# Patient Record
Sex: MALE | URBAN - METROPOLITAN AREA
[De-identification: names, ages, dates, MRNs, and addresses within clinical notes are randomized per-mention and may not be internally consistent; named-entity substitution may affect disease eponyms.]

---

## 2017-07-07 ENCOUNTER — OFFICE VISIT (OUTPATIENT)
Dept: FAMILY MEDICINE CLINIC | Age: 22
End: 2017-07-07

## 2017-07-07 VITALS
WEIGHT: 163 LBS | SYSTOLIC BLOOD PRESSURE: 123 MMHG | OXYGEN SATURATION: 99 % | HEIGHT: 68 IN | BODY MASS INDEX: 24.71 KG/M2 | DIASTOLIC BLOOD PRESSURE: 71 MMHG | HEART RATE: 72 BPM | TEMPERATURE: 98.1 F | RESPIRATION RATE: 18 BRPM

## 2017-07-07 DIAGNOSIS — R06.02 SHORTNESS OF BREATH: ICD-10-CM

## 2017-07-07 DIAGNOSIS — R00.0 RACING HEART BEAT: ICD-10-CM

## 2017-07-07 DIAGNOSIS — F41.1 GAD (GENERALIZED ANXIETY DISORDER): Primary | ICD-10-CM

## 2017-07-07 DIAGNOSIS — Z13.220 LIPID SCREENING: ICD-10-CM

## 2017-07-07 RX ORDER — ALPRAZOLAM 0.5 MG/1
0.5 TABLET ORAL
Qty: 15 TAB | Refills: 1 | Status: SHIPPED | OUTPATIENT
Start: 2017-07-07

## 2017-07-07 NOTE — PATIENT INSTRUCTIONS
Benzodiazepines: Potential side effects of benzodiazepine medications include, but are not limited to, the possibility of \"paradoxical agitation\" with irritability, aggressiveness or stimulated behavior; clumsiness, slurring of speech, dulled facies, psychomotor impairment, anterograde amnesia, impaired awareness of degree of drug effect, visual and hearing sensitivity impairment, other psychiatric/behavioral disturbances, impacts operating certain machinery or engaging in certain activities or employment, anxiety, insomnia, anorexia, tremor, nausea, vomiting, diarrhea and potential to develop tolerance, dependence, addiction and death from overdose. Use caution while taking benzodiazepines. There is a potential to overdose on this medication when too many pills are taken at one time. Do not mix alcohol with this medication because it can worsen side effects and be very dangerous.

## 2017-07-07 NOTE — PROGRESS NOTES
Chief Complaint   Patient presents with    Headache    Numbness     in leg    Labs     Patient in office today for sx that began 3 weeks ago;pt would like labs checked,pt is not fasting. 1. Have you been to the ER, urgent care clinic since your last visit? Hospitalized since your last visit? No    2. Have you seen or consulted any other health care providers outside of the [address] [place] [address] since your last visit? Include any pap smears or colon screening.  No

## 2017-07-07 NOTE — PROGRESS NOTES
Chief Complaint   Patient presents with    Headache    Numbness     in leg    Labs     Patient in office today for sx that began 3 weeks ago; pt would like labs checked, pt is not fasting. 1. Have you been to the ER, urgent care clinic since your last visit? Hospitalized since your last visit? No    2. Have you seen or consulted any other health care providers outside of the [address] [place] [address] since your last visit? Include any pap smears or colon screening. No    Pt noticed sx start about 3 weeks ago while drinking a pepsi. Had sx of legs feeling numb, dyspnea, and heart beating fast. Lasted for about 2-3 hours. Tried to jog it off but made the dyspnea worse. Denies any trauma or injury to the back. Describes as not being able to feel his legs. R>L. Denies legs giving out on him. Has recurred a few times since then. Last occurred about a week ago. Some associated cp. Denies any palpitations. Denies any OTCs for sx. Associated HA. Denies any dizziness. Family history of diabetes. Denies any n/v/c/d. Denies any urinary sx. Denies any fever. Pt has a history of anxiety in the past. Has previously been prescribed xanax in the past. Had similar sx when he was struggling with anxiety. Denies any increased stress levels. Denies any family history of anxiety. Denies any other concerns at this time. Chief Complaint   Patient presents with    Headache    Numbness     in leg    Labs     he is a 25y.o. year old male who presents for evalution. Reviewed PmHx, RxHx, FmHx, SocHx, AllgHx and updated and dated in the chart.     Review of Systems - negative except as listed above in the HPI    Objective:     Vitals:    07/07/17 1117   BP: 123/71   Pulse: 72   Resp: 18   Temp: 98.1 °F (36.7 °C)   TempSrc: Oral   SpO2: 99%   Weight: 163 lb (73.9 kg)   Height: 5' 8\" (1.727 m)     Physical Examination: General appearance - alert, well appearing, and in no distress  Mental status - anxious  Eyes - pupils equal and reactive, extraocular eye movements intact  Ears - bilateral TM's and external ear canals normal  Nose - normal and patent, no erythema, discharge or polyps and normal nontender sinuses  Mouth - mucous membranes moist, pharynx normal without lesions  Neck - supple, no significant adenopathy, carotids upstroke normal bilaterally, no bruits, thyroid exam: thyroid is normal in size without nodules or tenderness  Chest - clear to auscultation, no wheezes, rales or rhonchi, symmetric air entry  Heart - normal rate, regular rhythm, normal S1, S2, no murmurs    Assessment/ Plan:   Christie Gifford was seen today for headache, numbness and labs. Diagnoses and all orders for this visit:    KATARINA (generalized anxiety disorder)  -     METABOLIC PANEL, COMPREHENSIVE  -     CBC WITH AUTOMATED DIFF  -     TSH 3RD GENERATION  -     ALPRAZolam (XANAX) 0.5 mg tablet; Take 1 Tab by mouth daily as needed for Anxiety. Will notify results and deviate plan based on findings. Resume xanax daily as needed for acute anxiety sx. Reinforced SEs/ADRs of medication and how to take responsibly. Racing heart beat/ Shortness of breath  Sx seem to be consistent with poorly controlled anxiety. Enc pt to follow up if sx unresponsive to PRN xanax or if they persist /worsen. Lipid screening  -     LIPID PANEL  Will notify results and deviate plan based on findings. Enc heart healthy diet and exercise as tolerated. Follow-up Disposition:  Return if symptoms worsen or fail to improve. I have discussed the diagnosis with the patient and the intended plan as seen in the above orders. The patient has received an after-visit summary and questions were answered concerning future plans.      Medication Side Effects and Warnings were discussed with patient: yes  Patient Labs were reviewed and or requested: yes  Patient Past Records were reviewed and or requested  yes  Patient / Caregiver Understanding of treatment plan was verbalized during office visit YES    CHAPIN rPeston-C    Patient Instructions   Benzodiazepines: Potential side effects of benzodiazepine medications include, but are not limited to, the possibility of \"paradoxical agitation\" with irritability, aggressiveness or stimulated behavior; clumsiness, slurring of speech, dulled facies, psychomotor impairment, anterograde amnesia, impaired awareness of degree of drug effect, visual and hearing sensitivity impairment, other psychiatric/behavioral disturbances, impacts operating certain machinery or engaging in certain activities or employment, anxiety, insomnia, anorexia, tremor, nausea, vomiting, diarrhea and potential to develop tolerance, dependence, addiction and death from overdose. Use caution while taking benzodiazepines. There is a potential to overdose on this medication when too many pills are taken at one time. Do not mix alcohol with this medication because it can worsen side effects and be very dangerous.

## 2017-07-07 NOTE — MR AVS SNAPSHOT
Visit Information Date & Time Provider Department Dept. Phone Encounter #  
 7/7/2017 11:15 AM Tabitha Canales NP [address] [phone] [id number] Follow-up Instructions Return if symptoms worsen or fail to improve. Upcoming Health Maintenance Date Due  
 HPV AGE 9Y-34Y (1 of 3 - Male 3 Dose Series) 3/10/2007 DTaP/Tdap/Td series (1 - Tdap) 3/10/2017 INFLUENZA AGE 9 TO ADULT 8/1/2017 Allergies as of 7/7/2017  Review Complete On: 7/7/2017 By: Tabitha Canales NP Severity Noted Reaction Type Reactions Pcn [Penicillins] High 09/14/2015   Intolerance Rash Pollen Extracts Medium 09/14/2015    Sneezing Current Immunizations  Never Reviewed No immunizations on file. Not reviewed this visit You Were Diagnosed With   
  
 Codes Comments KATARINA (generalized anxiety disorder)    -  Primary ICD-10-CM: F41.1 ICD-9-CM: 300.02 Racing heart beat     ICD-10-CM: R00.0 ICD-9-CM: 682. 0 Shortness of breath     ICD-10-CM: R06.02 
ICD-9-CM: 786.05 Lipid screening     ICD-10-CM: M74.764 ICD-9-CM: V77.91 Vitals BP Pulse Temp Resp Height(growth percentile) Weight(growth percentile) 123/71 (BP 1 Location: Right arm, BP Patient Position: Sitting) 72 98.1 °F (36.7 °C) (Oral) 18 5' 8\" (1.727 m) 163 lb (73.9 kg) SpO2 BMI Smoking Status 99% 24.78 kg/m2 Never Smoker Vitals History BMI and BSA Data Body Mass Index Body Surface Area 24.78 kg/m 2 1.88 m 2 Preferred Pharmacy Pharmacy Name Phone [facility] [address] [place] [address] [address] [phone] Your Updated Medication List  
  
   
This list is accurate as of: 7/7/17 11:39 AM.  Always use your most recent med list.  
  
  
  
  
 ALPRAZolam 0.5 mg tablet Commonly known as:  Will Castro Take 1 Tab by mouth daily as needed for Anxiety. Iron 325 mg (65 mg iron) tablet Generic drug:  ferrous sulfate Take  by mouth Daily (before breakfast). Prescriptions Printed Refills ALPRAZolam (XANAX) 0.5 mg tablet 1 Sig: Take 1 Tab by mouth daily as needed for Anxiety. Class: Print Route: Oral  
  
We Performed the Following CBC WITH AUTOMATED DIFF [20343 CPT(R)] LIPID PANEL [67842 CPT(R)] METABOLIC PANEL, COMPREHENSIVE [05534 CPT(R)] TSH 3RD GENERATION [69807 CPT(R)] Follow-up Instructions Return if symptoms worsen or fail to improve. Patient Instructions Benzodiazepines: Potential side effects of benzodiazepine medications include, but are not limited to, the possibility of \"paradoxical agitation\" with irritability, aggressiveness or stimulated behavior; clumsiness, slurring of speech, dulled facies, psychomotor impairment, anterograde amnesia, impaired awareness of degree of drug effect, visual and hearing sensitivity impairment, other psychiatric/behavioral disturbances, impacts operating certain machinery or engaging in certain activities or employment, anxiety, insomnia, anorexia, tremor, nausea, vomiting, diarrhea and potential to develop tolerance, dependence, addiction and death from overdose. Use caution while taking benzodiazepines. There is a potential to overdose on this medication when too many pills are taken at one time. Do not mix alcohol with this medication because it can worsen side effects and be very dangerous. Introducing [facility]! [facility] introduces Efficiency Exchange patient portal. Now you can access parts of your medical record, email your doctor's office, and request medication refills online. 1. In your internet browser, go to [email or web address] 2. Click on the First Time User? Click Here link in the Sign In box. You will see the New Member Sign Up page. 3. Enter your Efficiency Exchange Access Code exactly as it appears below.  You will not need to use this code after youve completed the sign-up process. If you do not sign up before the expiration date, you must request a new code. · AdventureDrop Access Code: [email or web address] Expires: 10/5/2017 11:39 AM 
 
4. Enter the last four digits of your Social Security Number (xxxx) and Date of Birth (mm/dd/yyyy) as indicated and click Submit. You will be taken to the next sign-up page. 5. Create a AdventureDrop ID. This will be your AdventureDrop login ID and cannot be changed, so think of one that is secure and easy to remember. 6. Create a AdventureDrop password. You can change your password at any time. 7. Enter your Password Reset Question and Answer. This can be used at a later time if you forget your password. 8. Enter your e-mail address. You will receive e-mail notification when new information is available in [address] 9. Click Sign Up. You can now view and download portions of your medical record. 10. Click the Download Summary menu link to download a portable copy of your medical information. If you have questions, please visit the Frequently Asked Questions section of the AdventureDrop website. Remember, AdventureDrop is NOT to be used for urgent needs. For medical emergencies, dial 911. Now available from your iPhone and Android! Please provide this summary of care documentation to your next provider. Your primary care clinician is listed as ANNE WALL. If you have any questions after today's visit, please call [phone].

## 2017-07-08 LAB
ALBUMIN SERPL-MCNC: 4.3 G/DL (ref 3.5–5.5)
ALBUMIN/GLOB SERPL: 1.5 {RATIO} (ref 1.2–2.2)
ALP SERPL-CCNC: 51 IU/L (ref 39–117)
ALT SERPL-CCNC: 16 IU/L (ref 0–44)
AST SERPL-CCNC: 17 IU/L (ref 0–40)
BASOPHILS # BLD AUTO: 0.1 X10E3/UL (ref 0–0.2)
BASOPHILS NFR BLD AUTO: 1 %
BILIRUB SERPL-MCNC: 0.4 MG/DL (ref 0–1.2)
BUN SERPL-MCNC: 11 MG/DL (ref 6–20)
BUN/CREAT SERPL: 9 (ref 9–20)
CALCIUM SERPL-MCNC: 9.5 MG/DL (ref 8.7–10.2)
CHLORIDE SERPL-SCNC: 103 MMOL/L (ref 96–106)
CHOLEST SERPL-MCNC: 137 MG/DL (ref 100–199)
CO2 SERPL-SCNC: 25 MMOL/L (ref 18–29)
CREAT SERPL-MCNC: 1.18 MG/DL (ref 0.76–1.27)
EOSINOPHIL # BLD AUTO: 0.1 X10E3/UL (ref 0–0.4)
EOSINOPHIL NFR BLD AUTO: 3 %
ERYTHROCYTE [DISTWIDTH] IN BLOOD BY AUTOMATED COUNT: 12.9 % (ref 12.3–15.4)
GLOBULIN SER CALC-MCNC: 2.9 G/DL (ref 1.5–4.5)
GLUCOSE SERPL-MCNC: 89 MG/DL (ref 65–99)
HCT VFR BLD AUTO: 43 % (ref 37.5–51)
HDLC SERPL-MCNC: 42 MG/DL
HGB BLD-MCNC: 13.9 G/DL (ref 12.6–17.7)
IMM GRANULOCYTES # BLD: 0 X10E3/UL (ref 0–0.1)
IMM GRANULOCYTES NFR BLD: 0 %
INTERPRETATION, 910389: NORMAL
LDLC SERPL CALC-MCNC: 84 MG/DL (ref 0–99)
LYMPHOCYTES # BLD AUTO: 1.7 X10E3/UL (ref 0.7–3.1)
LYMPHOCYTES NFR BLD AUTO: 47 %
MCH RBC QN AUTO: 26.1 PG (ref 26.6–33)
MCHC RBC AUTO-ENTMCNC: 32.3 G/DL (ref 31.5–35.7)
MCV RBC AUTO: 81 FL (ref 79–97)
MONOCYTES # BLD AUTO: 0.3 X10E3/UL (ref 0.1–0.9)
MONOCYTES NFR BLD AUTO: 8 %
NEUTROPHILS # BLD AUTO: 1.5 X10E3/UL (ref 1.4–7)
NEUTROPHILS NFR BLD AUTO: 41 %
PLATELET # BLD AUTO: 355 X10E3/UL (ref 150–379)
POTASSIUM SERPL-SCNC: 4.4 MMOL/L (ref 3.5–5.2)
PROT SERPL-MCNC: 7.2 G/DL (ref 6–8.5)
RBC # BLD AUTO: 5.32 X10E6/UL (ref 4.14–5.8)
SODIUM SERPL-SCNC: 141 MMOL/L (ref 134–144)
TRIGL SERPL-MCNC: 54 MG/DL (ref 0–149)
TSH SERPL DL<=0.005 MIU/L-ACNC: 1.02 UIU/ML (ref 0.45–4.5)
VLDLC SERPL CALC-MCNC: 11 MG/DL (ref 5–40)
WBC # BLD AUTO: 3.6 X10E3/UL (ref 3.4–10.8)

## 2017-07-10 NOTE — PROGRESS NOTES
The following message was sent to pt via [facility] portal in reference to lab results:    Good morning Mr. Brandon Wagner are the results of your most recent lab work. I have the following recommendations:    1. Your CBC which looks at your white blood cells, red blood cells, and hemoglobin came back looking normal. No sign of infection or anemia. 2. Your metabolic panel which looks at your blood glucose, liver function, and kidney function looks perfect. 3. Your cholesterol came back looking great so keep up the good work with diet and exercise. 4. Your TSH which screens for thyroid disease came back normal. This means you do not have hyper or hypothyroidism. Lets recheck these labs in 1 year.  Please do not hesitate to call me or schedule an appointment to be seen if you need anything else in the meantime :)    MATTHEW Estrada

## 2017-08-03 ENCOUNTER — OFFICE VISIT (OUTPATIENT)
Dept: FAMILY MEDICINE CLINIC | Age: 22
End: 2017-08-03

## 2017-08-03 VITALS
TEMPERATURE: 98.4 F | WEIGHT: 160 LBS | SYSTOLIC BLOOD PRESSURE: 116 MMHG | DIASTOLIC BLOOD PRESSURE: 72 MMHG | HEART RATE: 68 BPM | HEIGHT: 68 IN | RESPIRATION RATE: 20 BRPM | BODY MASS INDEX: 24.25 KG/M2 | OXYGEN SATURATION: 98 %

## 2017-08-03 DIAGNOSIS — F32.A DEPRESSION, UNSPECIFIED DEPRESSION TYPE: Primary | ICD-10-CM

## 2017-08-03 RX ORDER — CITALOPRAM 20 MG/1
20 TABLET, FILM COATED ORAL DAILY
Qty: 30 TAB | Refills: 1 | Status: SHIPPED | OUTPATIENT
Start: 2017-08-03

## 2017-08-03 NOTE — PROGRESS NOTES
Patient here for random sadness x couple of years. It is not getting worse, but it still continues. 1. Have you been to the ER, urgent care clinic since your last visit? Hospitalized since your last visit? No    2. Have you seen or consulted any other health care providers outside of the [address] [place] [address] since your last visit? Include any pap smears or colon screening. No       Has random sadness and anxiety       Chief Complaint   Patient presents with    Behavioral Problem     random sadness x couple years     he is a 25y.o. year old male who presents for evalution. Reviewed PmHx, RxHx, FmHx, SocHx, AllgHx and updated and dated in the chart. Patient Active Problem List    Diagnosis    Episodic circadian rhythm sleep disorder, shift work type       Review of Systems - negative except as listed above in the HPI    Objective:     Vitals:    08/03/17 1505   BP: 116/72   Pulse: 68   Resp: 20   Temp: 98.4 °F (36.9 °C)   SpO2: 98%   Weight: 160 lb (72.6 kg)   Height: 5' 8\" (1.727 m)     Physical Examination: General appearance - alert, well appearing, and in no distress    Assessment/ Plan:   Diagnoses and all orders for this visit:    1. Depression, unspecified depression type  -     citalopram (CELEXA) 20 mg tablet; Take 1 Tab by mouth daily. Indications: DEPRESSION ASSOCIATED WITH BIPOLAR DISORDER       Follow-up Disposition:  Return in about 1 month (around 9/3/2017). I have discussed the diagnosis with the patient and the intended plan as seen in the above orders. The patient understands and agrees with the plan. The patient has received an after-visit summary and questions were answered concerning future plans. Medication Side Effects and Warnings were discussed with patient  Patient Labs were reviewed and or requested:  Patient Past Records were reviewed and or requested    Benny Baez M.D. There are no Patient Instructions on file for this visit.

## 2022-09-01 ENCOUNTER — VIRTUAL VISIT (OUTPATIENT)
Dept: FAMILY MEDICINE CLINIC | Age: 27
End: 2022-09-01

## 2022-09-01 DIAGNOSIS — G47.33 OSA (OBSTRUCTIVE SLEEP APNEA): ICD-10-CM

## 2022-09-01 DIAGNOSIS — F51.01 PRIMARY INSOMNIA: Primary | ICD-10-CM

## 2022-09-01 PROCEDURE — 99213 OFFICE O/P EST LOW 20 MIN: CPT | Performed by: FAMILY MEDICINE

## 2022-09-01 RX ORDER — TRAZODONE HYDROCHLORIDE 50 MG/1
50 TABLET ORAL
Qty: 30 TABLET | Refills: 1 | Status: SHIPPED | OUTPATIENT
Start: 2022-09-01

## 2022-09-01 NOTE — PROGRESS NOTES
Consent: Garrett Hashimoto, who was seen by synchronous (real-time) audio-video technology, and/or his healthcare decision maker, is aware that this patient-initiated, Telehealth encounter on 9/1/2022 is a billable service, with coverage as determined by his insurance carrier. He is aware that he may receive a bill and has provided verbal consent to proceed: YES-Consent obtained within past 12 months  712    Prior to Admission medications    Medication Sig Start Date End Date Taking? Authorizing Provider   traZODone (DESYREL) 50 mg tablet Take 1 Tablet by mouth nightly. 9/1/22  Yes Lady Arias MD   citalopram (CELEXA) 20 mg tablet Take 1 Tab by mouth daily. Indications: DEPRESSION ASSOCIATED WITH BIPOLAR DISORDER 8/3/17   Lady Arias MD   ALPRAZolam Letitia Gather) 0.5 mg tablet Take 1 Tab by mouth daily as needed for Anxiety. 7/7/17   Valerie Garza NP   ferrous sulfate (IRON) 325 mg (65 mg iron) tablet Take  by mouth Daily (before breakfast). Provider, Historical     Allergies   Allergen Reactions    Pcn [Penicillins] Rash    Pollen Extracts Sneezing           Assessment & Plan:   Diagnoses and all orders for this visit:    1. Primary insomnia  -     traZODone (DESYREL) 50 mg tablet; Take 1 Tablet by mouth nightly. Patient having difficulty falling asleep. He is using his CPAP but that has not solved the overall problem. We will add medicine as above and follow-up if not better. 2. ELAINE (obstructive sleep apnea)  Advised patient to continue use of CPAP. Medication Side Effects and Warnings were discussed with patient  Patient Labs were reviewed and or requested:  Patient Past Records were reviewed and or requested              We discussed the expected course, resolution and complications of the diagnosis(es) in detail. Medication risks, benefits, costs, interactions, and alternatives were discussed as indicated.   I advised him to contact the office if his condition worsens, changes or fails to improve as anticipated. He expressed understanding with the diagnosis(es) and plan. Meera Marsh, was evaluated through a synchronous (real-time) audio-video encounter. The patient (or guardian if applicable) is aware that this is a billable service, which includes applicable co-pays. This Virtual Visit was conducted with patient's (and/or legal guardian's) consent. The visit was conducted pursuant to the emergency declaration under the [address] [place] [address] authority and the [employer] and [employer] General Act. Patient identification was verified, and a caregiver was present when appropriate. The patient was located in a state where the provider was licensed to provide care. Services were provided through a video synchronous discussion virtually to substitute for in-person clinic visit. Patient and provider were located at their individual homes. I have discussed the diagnosis with the patient and the intended plan as seen in the above orders. The patient understands and agrees with the plan. The patient has received an after-visit summary and questions were answered concerning future plans. Medication Side Effects and Warnings were discussed with patient  Patient Labs were reviewed and or requested:  Patient Past Records were reviewed and or requested    Jerome Whitley M.D. There are no Patient Instructions on file for this visit.

## 2022-12-23 ENCOUNTER — TELEPHONE (OUTPATIENT)
Dept: SLEEP MEDICINE | Age: 27
End: 2022-12-23

## 2023-05-10 ENCOUNTER — TELEPHONE (OUTPATIENT)
Age: 28
End: 2023-05-10

## 2023-05-15 ENCOUNTER — TELEMEDICINE (OUTPATIENT)
Age: 28
End: 2023-05-15
Payer: MEDICAID

## 2023-05-15 DIAGNOSIS — R05.3 CHRONIC COUGH: Primary | ICD-10-CM

## 2023-05-15 PROCEDURE — 99213 OFFICE O/P EST LOW 20 MIN: CPT | Performed by: NURSE PRACTITIONER

## 2023-05-15 RX ORDER — FEXOFENADINE HCL 180 MG/1
180 TABLET ORAL DAILY
Qty: 90 TABLET | Refills: 1 | Status: SHIPPED | OUTPATIENT
Start: 2023-05-15

## 2023-05-15 SDOH — ECONOMIC STABILITY: INCOME INSECURITY: HOW HARD IS IT FOR YOU TO PAY FOR THE VERY BASICS LIKE FOOD, HOUSING, MEDICAL CARE, AND HEATING?: NOT VERY HARD

## 2023-05-15 SDOH — ECONOMIC STABILITY: FOOD INSECURITY: WITHIN THE PAST 12 MONTHS, YOU WORRIED THAT YOUR FOOD WOULD RUN OUT BEFORE YOU GOT MONEY TO BUY MORE.: NEVER TRUE

## 2023-05-15 SDOH — ECONOMIC STABILITY: FOOD INSECURITY: WITHIN THE PAST 12 MONTHS, THE FOOD YOU BOUGHT JUST DIDN'T LAST AND YOU DIDN'T HAVE MONEY TO GET MORE.: NEVER TRUE

## 2023-05-15 SDOH — ECONOMIC STABILITY: HOUSING INSECURITY
IN THE LAST 12 MONTHS, WAS THERE A TIME WHEN YOU DID NOT HAVE A STEADY PLACE TO SLEEP OR SLEPT IN A SHELTER (INCLUDING NOW)?: NO

## 2023-05-15 ASSESSMENT — ENCOUNTER SYMPTOMS
SINUS PAIN: 0
RHINORRHEA: 0
SHORTNESS OF BREATH: 0
WHEEZING: 0
COUGH: 1
SORE THROAT: 0
GASTROINTESTINAL NEGATIVE: 1
EYES NEGATIVE: 1
ALLERGIC/IMMUNOLOGIC NEGATIVE: 1

## 2023-05-15 ASSESSMENT — PATIENT HEALTH QUESTIONNAIRE - PHQ9
SUM OF ALL RESPONSES TO PHQ QUESTIONS 1-9: 0
SUM OF ALL RESPONSES TO PHQ9 QUESTIONS 1 & 2: 0
2. FEELING DOWN, DEPRESSED OR HOPELESS: 0
SUM OF ALL RESPONSES TO PHQ QUESTIONS 1-9: 0
SUM OF ALL RESPONSES TO PHQ QUESTIONS 1-9: 0
1. LITTLE INTEREST OR PLEASURE IN DOING THINGS: 0
SUM OF ALL RESPONSES TO PHQ QUESTIONS 1-9: 0

## 2023-05-15 ASSESSMENT — ANXIETY QUESTIONNAIRES
IF YOU CHECKED OFF ANY PROBLEMS ON THIS QUESTIONNAIRE, HOW DIFFICULT HAVE THESE PROBLEMS MADE IT FOR YOU TO DO YOUR WORK, TAKE CARE OF THINGS AT HOME, OR GET ALONG WITH OTHER PEOPLE: NOT DIFFICULT AT ALL
7. FEELING AFRAID AS IF SOMETHING AWFUL MIGHT HAPPEN: 0
5. BEING SO RESTLESS THAT IT IS HARD TO SIT STILL: 0
GAD7 TOTAL SCORE: 1
6. BECOMING EASILY ANNOYED OR IRRITABLE: 0
4. TROUBLE RELAXING: 0
2. NOT BEING ABLE TO STOP OR CONTROL WORRYING: 0
3. WORRYING TOO MUCH ABOUT DIFFERENT THINGS: 1
1. FEELING NERVOUS, ANXIOUS, OR ON EDGE: 0

## 2023-05-15 NOTE — PROGRESS NOTES
Tristan Robertson is a 33 y.o. male , id x 2(name and ). Reviewed questionnaires, and  medications. Chief Complaint   Patient presents with    Cough     Constant dry cough x 5 years. Tried albuterol inhaler, vannair inhaler no relief. Developed sleep apnea, thinks it may be related. 1. Have you been to the ER, urgent care clinic since your last visit? Hospitalized since your last visit? No    2. Have you seen or consulted any other health care providers outside of the [address] [place] [address] since your last visit? Include any pap smears or colon screening.  No

## 2023-05-15 NOTE — PROGRESS NOTES
Melida Mccall (:  1995) is a Established patient, presenting virtually for evaluation of the following:    Assessment & Plan :    Below is the assessment and plan developed based on review of pertinent history, physical exam, labs, studies, and medications. 1. Chronic cough  Dwp most common causes of chronic cough for patients in his age group include allergic rhinitis and GERD  Will add antihistamine daily. If no improvement after 4 weeks, favor trial of PPI  Recommended follow up with pulmonary for previous abn PFT, referral entered to Dr. Marielle Phan. Patient will check with his new insurance carrier and let me know if provider needs to be changed  -     XR CHEST (2 VIEWS); Future  -     fexofenadine (ALLEGRA) 180 MG tablet; Take 1 tablet by mouth daily, Disp-90 tablet, R-1Normal  -     AFL - Suri Bailon MD, Pulmonology, [place]    Return in about 4 weeks (around 2023), or if symptoms worsen or fail to improve, for cough. I have discussed the diagnosis with the patient and the intended plan as seen in the above orders, and questions were answered concerning future plans. Patient conveyed understanding of the plan at the time of the visit. Subjective :    Presents for evaluation of chronic cough. Complains of 5-year history of dry cough. Reports a few dry coughs about once an hour. Has tried albuterol inhaler without improvement.   had a chest x-ray or several years ago which he reports was normal. Had PFT with pulmonologist about 2 years ago which he reports was abnormal, but did not follow through with recommended follow up visit. Denies sputum production. Denies wheezing or shortness of breath. Denies fever or chills. No nasal congestion or rhinorrhea. + sneezing recently with pollen. Tried allergy medication in the past for his symptoms but did not notice improvement. No heartburn symptoms. History reviewed. No pertinent past medical history. History reviewed.  No

## 2023-07-27 ENCOUNTER — TELEPHONE (OUTPATIENT)
Age: 28
End: 2023-07-27

## 2023-07-27 NOTE — TELEPHONE ENCOUNTER
Princess Ambrosio with [facility] called in and is asking for a dr to  referral & insurance referral. She states [facility]. Polo Nelson with [facility] referred pt to them. Dr. Bessy Kaur is out of network for the insurance and not able to do referral for [facility]. Princess Ambrosio states she is more than happy to send over any office notes and/or sleep study if you need it. He will most liking be seeing Dr. Corrin Gowers at [address] [facility], [facility]. States dx codes to use is g47.33 &  for eval & treat for oral appliance therapy. Call back number for Princess Ambrosio is [phone] option 1, fax number for them is [phone]. Thanks.

## 2023-07-27 NOTE — TELEPHONE ENCOUNTER
- referral for eval and treat faxed to [facility], [phone]. Confirmed. Information for insurance referral sent to Sandra.

## 2024-05-24 ENCOUNTER — TELEPHONE (OUTPATIENT)
Age: 29
End: 2024-05-24

## 2024-05-24 DIAGNOSIS — K21.9 GASTROESOPHAGEAL REFLUX DISEASE, UNSPECIFIED WHETHER ESOPHAGITIS PRESENT: Primary | ICD-10-CM

## 2024-05-24 NOTE — TELEPHONE ENCOUNTER
Left another VM for patient to call back to reschedule his appointment or reason for referral so we can enter that for him.

## 2024-05-24 NOTE — TELEPHONE ENCOUNTER
Referral placed.  Please advise patient to check the provider is in-network and preferred to minimize cost to patient

## 2024-05-24 NOTE — TELEPHONE ENCOUNTER
LVM for patient to call back with the reason for his referral to a Gastro specialist. We can cancel his appointment today as Dr. Mandel is happy to put the referral in without seeing him as she is sick today. Appointment will need to be canceled or rescheduled if needed.

## 2024-05-24 NOTE — TELEPHONE ENCOUNTER
Patient showed up to the office for appointment after multiple attempts to reach him. [place] for G[place]D. He was seen in the ED and referred to Gastro. He needs a referral for a endoscopy. He was given medication and asks for a referral to Gastro with [facility].

## 2024-05-24 NOTE — TELEPHONE ENCOUNTER
Called pt & lvm to call us back and give us the info of why referral to Gastro is needed and that Tequila would put it in for him without an apt.

## 2024-07-22 ENCOUNTER — CLINICAL DOCUMENTATION (OUTPATIENT)
Age: 29
End: 2024-07-22